# Patient Record
(demographics unavailable — no encounter records)

---

## 2025-05-20 NOTE — ADDENDUM
[FreeTextEntry1] : Patient's note was transcribed with the assistance of a medical scribe under the supervision of Dr. Newton. I, Dr. Newton, have reviewed the patient's chart and agree that it aligns with my medical decisions. Brady Scott, our scribe, also served as a chaperone for physical examination purposes.

## 2025-05-20 NOTE — ASSESSMENT
[FreeTextEntry1] : PIERRE JURADO is a 61-year-old male with BPH on Flomax with family history of prostate cancer and elevated PSA.  MRI prostate negative October 2024 aside from BPH, low PSA density  - cont Flomax for BPH and stable LUTS. - cont Sildenafil for chronic ED. - f/u 6 months with PSA prior.   Significant family history of lethal prostate cancer.  Therefore we will continue to closely monitor.  He is aware of importance of follow-up as previously has been noncompliant.   We discussed biopsy now versus defer. Patient elects to defer prostate biopsy at this time which is consistent with our [facility] algorithm of not biopsying patients if MRI is negative and there are no risk factors and there is a low PSA density.  Patient understands the risk of deferring prostate biopsy such as developing metastatic disease and understands the importance of follow-up.   bph elevated psa ED are chronic condition(s) which require longitudinal follow-up.

## 2025-05-20 NOTE — HISTORY OF PRESENT ILLNESS
[FreeTextEntry1] : PIERRE JURADO is a 61-year-old male with BPH on Flomax with family history of prostate cancer and elevated PSA x 1, PSA now decreased though elevated compared to prior year. Chronic ED.  DId not follow up after last visit.  Pt is taking Flomax daily with improved LUTS. He takes 1 tab sildenafil without issues.  Denies flank pain, gross hematuria, dysuria or associated symptoms.   MR Prostate w/wo IV Cont 10/2024 shows - No suspicious lesions. 4mm intravesical protrusion. Prostate 40g.   Labs 03/2025 Cr 1.14 GFR 73 UA - negative PSA - 3.1 (PSAD = 0.078)  previously PSA 05/2024- 2.5 ng/mL.  PSA 08/2023- 2.34 ng/mL.   He has significant family history of prostate cancer. Brother diagnosed at age 55 and passed from the disease.   Lab 4/2023 - Cr 0.77, EGFR 103, A1c 5.5  PSA 1/2022 - 1.6  11/2022- 7.2 %free 10  Denies  PMH including previous kidney stones, recurrent UTIs. Had ?cystoscopy with outside urologist and likely urodynamics based on description Family History: Brother had Pca diagnosed at 55y.o and passed from the disease Social History:previously was in shelters living in [facility] now in [place], unemployed,